# Patient Record
Sex: FEMALE | Race: WHITE | HISPANIC OR LATINO | Employment: OTHER | URBAN - METROPOLITAN AREA
[De-identification: names, ages, dates, MRNs, and addresses within clinical notes are randomized per-mention and may not be internally consistent; named-entity substitution may affect disease eponyms.]

---

## 2017-07-26 ENCOUNTER — TELEPHONE (OUTPATIENT)
Dept: ELECTROPHYSIOLOGY | Facility: CLINIC | Age: 82
End: 2017-07-26

## 2017-07-26 NOTE — TELEPHONE ENCOUNTER
Left message notifying pt that Dr. Shaw was booking out of clinic on 8/11/17 and that her appointment will be cancelled.  Instructed pt to call the office to reschedule appointment at her convenience.

## 2017-08-11 ENCOUNTER — CLINICAL SUPPORT (OUTPATIENT)
Dept: ELECTROPHYSIOLOGY | Facility: CLINIC | Age: 82
End: 2017-08-11
Payer: MEDICARE

## 2017-08-11 DIAGNOSIS — Z95.0 CARDIAC PACEMAKER IN SITU: Primary | ICD-10-CM

## 2017-08-11 DIAGNOSIS — Z95.0 CARDIAC PACEMAKER IN SITU: ICD-10-CM

## 2017-08-11 PROCEDURE — 93280 PM DEVICE PROGR EVAL DUAL: CPT | Mod: PBBFAC | Performed by: INTERNAL MEDICINE

## 2017-08-14 ENCOUNTER — OFFICE VISIT (OUTPATIENT)
Dept: INTERNAL MEDICINE | Facility: CLINIC | Age: 82
End: 2017-08-14
Payer: MEDICARE

## 2017-08-14 ENCOUNTER — LAB VISIT (OUTPATIENT)
Dept: LAB | Facility: HOSPITAL | Age: 82
End: 2017-08-14
Attending: INTERNAL MEDICINE
Payer: MEDICARE

## 2017-08-14 VITALS
SYSTOLIC BLOOD PRESSURE: 131 MMHG | HEIGHT: 61 IN | BODY MASS INDEX: 32.38 KG/M2 | HEART RATE: 66 BPM | DIASTOLIC BLOOD PRESSURE: 60 MMHG | WEIGHT: 171.5 LBS

## 2017-08-14 DIAGNOSIS — R60.0 BILATERAL LOWER EXTREMITY EDEMA: ICD-10-CM

## 2017-08-14 DIAGNOSIS — Z79.891 CHRONIC PRESCRIPTION OPIATE USE: ICD-10-CM

## 2017-08-14 DIAGNOSIS — E79.0 HYPERURICEMIA: ICD-10-CM

## 2017-08-14 DIAGNOSIS — I10 ESSENTIAL HYPERTENSION: ICD-10-CM

## 2017-08-14 DIAGNOSIS — R20.2 PARESTHESIA OF SKIN: ICD-10-CM

## 2017-08-14 DIAGNOSIS — R32 URINARY INCONTINENCE, UNSPECIFIED TYPE: ICD-10-CM

## 2017-08-14 DIAGNOSIS — M25.50 ARTHRALGIA, UNSPECIFIED JOINT: ICD-10-CM

## 2017-08-14 DIAGNOSIS — M75.52 BURSITIS OF BOTH SHOULDERS: Primary | ICD-10-CM

## 2017-08-14 DIAGNOSIS — M75.51 BURSITIS OF BOTH SHOULDERS: Primary | ICD-10-CM

## 2017-08-14 DIAGNOSIS — E78.5 HYPERLIPIDEMIA, UNSPECIFIED HYPERLIPIDEMIA TYPE: ICD-10-CM

## 2017-08-14 DIAGNOSIS — G89.29 OTHER CHRONIC PAIN: ICD-10-CM

## 2017-08-14 LAB
ALBUMIN SERPL BCP-MCNC: 3.9 G/DL
ALP SERPL-CCNC: 95 U/L
ALT SERPL W/O P-5'-P-CCNC: 12 U/L
ANION GAP SERPL CALC-SCNC: 9 MMOL/L
AST SERPL-CCNC: 15 U/L
BILIRUB SERPL-MCNC: 0.5 MG/DL
BNP SERPL-MCNC: 69 PG/ML
BUN SERPL-MCNC: 24 MG/DL
CALCIUM SERPL-MCNC: 9.3 MG/DL
CHLORIDE SERPL-SCNC: 103 MMOL/L
CO2 SERPL-SCNC: 27 MMOL/L
CREAT SERPL-MCNC: 0.9 MG/DL
ERYTHROCYTE [DISTWIDTH] IN BLOOD BY AUTOMATED COUNT: 14.4 %
EST. GFR  (AFRICAN AMERICAN): >60 ML/MIN/1.73 M^2
EST. GFR  (NON AFRICAN AMERICAN): 57.7 ML/MIN/1.73 M^2
GLUCOSE SERPL-MCNC: 99 MG/DL
HCT VFR BLD AUTO: 38.1 %
HGB BLD-MCNC: 12.7 G/DL
MCH RBC QN AUTO: 30.2 PG
MCHC RBC AUTO-ENTMCNC: 33.3 G/DL
MCV RBC AUTO: 91 FL
PLATELET # BLD AUTO: 178 K/UL
PMV BLD AUTO: 11.5 FL
POTASSIUM SERPL-SCNC: 5.1 MMOL/L
PROT SERPL-MCNC: 7.2 G/DL
RBC # BLD AUTO: 4.2 M/UL
RHEUMATOID FACT SERPL-ACNC: <10 IU/ML
SODIUM SERPL-SCNC: 139 MMOL/L
TSH SERPL DL<=0.005 MIU/L-ACNC: 2.57 UIU/ML
WBC # BLD AUTO: 7.39 K/UL

## 2017-08-14 PROCEDURE — 36415 COLL VENOUS BLD VENIPUNCTURE: CPT

## 2017-08-14 PROCEDURE — 84550 ASSAY OF BLOOD/URIC ACID: CPT

## 2017-08-14 PROCEDURE — 84443 ASSAY THYROID STIM HORMONE: CPT

## 2017-08-14 PROCEDURE — 86038 ANTINUCLEAR ANTIBODIES: CPT

## 2017-08-14 PROCEDURE — 80053 COMPREHEN METABOLIC PANEL: CPT

## 2017-08-14 PROCEDURE — 99214 OFFICE O/P EST MOD 30 MIN: CPT | Mod: PBBFAC | Performed by: INTERNAL MEDICINE

## 2017-08-14 PROCEDURE — 99999 PR PBB SHADOW E&M-EST. PATIENT-LVL IV: CPT | Mod: PBBFAC,,, | Performed by: INTERNAL MEDICINE

## 2017-08-14 PROCEDURE — 86431 RHEUMATOID FACTOR QUANT: CPT

## 2017-08-14 PROCEDURE — 85027 COMPLETE CBC AUTOMATED: CPT

## 2017-08-14 PROCEDURE — 99204 OFFICE O/P NEW MOD 45 MIN: CPT | Mod: S$PBB,,, | Performed by: INTERNAL MEDICINE

## 2017-08-14 PROCEDURE — 83880 ASSAY OF NATRIURETIC PEPTIDE: CPT

## 2017-08-14 PROCEDURE — 80061 LIPID PANEL: CPT

## 2017-08-14 PROCEDURE — 82607 VITAMIN B-12: CPT

## 2017-08-14 RX ORDER — FELODIPINE 5 MG/1
5 TABLET, EXTENDED RELEASE ORAL DAILY
COMMUNITY
End: 2019-03-11 | Stop reason: SDUPTHER

## 2017-08-14 RX ORDER — DULOXETIN HYDROCHLORIDE 60 MG/1
60 CAPSULE, DELAYED RELEASE ORAL DAILY
COMMUNITY
End: 2019-03-11 | Stop reason: SDUPTHER

## 2017-08-14 RX ORDER — DICLOFENAC SODIUM 10 MG/G
2 GEL TOPICAL DAILY
COMMUNITY
End: 2019-03-19 | Stop reason: SDUPTHER

## 2017-08-14 RX ORDER — DEXTROMETHORPHAN HYDROBROMIDE, GUAIFENESIN 5; 100 MG/5ML; MG/5ML
650 LIQUID ORAL EVERY 8 HOURS
COMMUNITY

## 2017-08-14 RX ORDER — PREGABALIN 75 MG/1
75 CAPSULE ORAL 2 TIMES DAILY
COMMUNITY
End: 2019-03-11 | Stop reason: SDUPTHER

## 2017-08-14 RX ORDER — MECLIZINE HYDROCHLORIDE CHEWABLE TABLETS 25 MG/1
32 TABLET, CHEWABLE ORAL
COMMUNITY

## 2017-08-14 RX ORDER — ROSUVASTATIN CALCIUM 20 MG/1
20 TABLET, COATED ORAL DAILY
COMMUNITY

## 2017-08-14 RX ORDER — OXYCODONE AND ACETAMINOPHEN 7.5; 325 MG/1; MG/1
1 TABLET ORAL EVERY 4 HOURS PRN
COMMUNITY
End: 2017-08-14 | Stop reason: SDUPTHER

## 2017-08-14 RX ORDER — OXYCODONE AND ACETAMINOPHEN 7.5; 325 MG/1; MG/1
TABLET ORAL
Qty: 60 TABLET | Refills: 0 | Status: SHIPPED | OUTPATIENT
Start: 2017-08-14

## 2017-08-14 RX ORDER — BISACODYL 5 MG
5 TABLET, DELAYED RELEASE (ENTERIC COATED) ORAL DAILY PRN
COMMUNITY

## 2017-08-14 RX ORDER — CARVEDILOL 6.25 MG/1
6.25 TABLET ORAL 2 TIMES DAILY WITH MEALS
COMMUNITY
End: 2019-03-11 | Stop reason: SDUPTHER

## 2017-08-14 NOTE — PROGRESS NOTES
Subjective:       Patient ID: Dalia Steele is a 87 y.o. female.    Chief Complaint: Establish Care    HPI    Patient is accompanied by son, Norbert.    First visit with me, upcoming appointment with Cardiology EP.     Numbness L hand. Going on a few weeks, most notable in mornings.    history of gout L hand.    bilateral shoulder pain, worse with movement.     Ileus with IM and oral steroids in past.    Reviewed PMH, PSH, SH, FH, allergies, and medications.     Review of Systems   All other systems reviewed and are negative.      Objective:      Physical Exam   Constitutional: No distress.    woman whose Body mass index is 32.41 kg/m². Ambulates with walker. Exam conducted from chair.   HENT:   Head: Atraumatic.   Right Ear: Tympanic membrane normal.   Left Ear: Tympanic membrane normal.   Mouth/Throat: Oropharynx is clear and moist. No oropharyngeal exudate.   Eyes: Pupils are equal, round, and reactive to light. Right eye exhibits no discharge. Left eye exhibits no discharge.   Neck: Normal range of motion. No thyromegaly present.   Cardiovascular: Normal rate and regular rhythm.    Murmur heard.   Systolic (KATYA RUSB) murmur is present with a grade of 3/6   Pulmonary/Chest: Effort normal and breath sounds normal. No stridor. She has no wheezes. She has no rales.   Abdominal: Soft. She exhibits no distension. There is no tenderness. There is no guarding.   Musculoskeletal: She exhibits edema (soft pitting edema in b/L LE).   Lymphadenopathy:     She has no cervical adenopathy.   Neurological: She is alert. Gait (ambulates with walker with limp) abnormal.    intact bilaterally    Skin: Skin is warm and dry. No rash noted.   Psychiatric: She has a normal mood and affect. Her behavior is normal.   Nursing note and vitals reviewed.      Vitals:    08/14/17 1144   BP: 131/60   BP Location: Left arm   Patient Position: Sitting   BP Method: Large (Manual)   Pulse: 66   Weight: 77.8 kg (171 lb 8.3 oz)   Height:  "5' 1" (1.549 m)     Body mass index is 32.41 kg/m².    Assessment:       1. Bursitis of both shoulders    2. Other chronic pain    3. Hyperlipidemia, unspecified hyperlipidemia type    4. Essential hypertension    5. Arthralgia, unspecified joint    6. Hyperuricemia    7. Paresthesia of skin    8. Bilateral lower extremity edema    9. Urinary incontinence, unspecified type        Plan:   Dalia was seen today for establish care.    Diagnoses and all orders for this visit:    Bursitis of both shoulders:  Likely related to regular use of walker. Interested in eval with Sports Med Dr Garibay, referral placed.  -     Cancel: Ambulatory referral to Orthopedics  -     Ambulatory consult to Sports Medicine    Other chronic pain:  Longstanding, likely related to spondylosis, symptoms are well controlled and stable on 1/2 tab Percocet BID, no adverse side effects. Continue to prescribe via PCP.  -     oxycodone-acetaminophen (PERCOCET) 7.5-325 mg per tablet; Take 1/2 tablet two times a day    Hyperlipidemia, unspecified hyperlipidemia type:  Pt on Crestor, will continue and recheck levels.  -     Lipid panel; Future    Essential hypertension:  Prior diagnosis, well controlled on current management. No changes at this time, will continue to monitor.   -     CBC Without Differential; Future  -     Comprehensive metabolic panel; Future  -     TSH; Future    Arthralgia, unspecified joint:  Joint pain in hand may be due to autoimmune disease, check on labs. If pain persists refer to Orthopedics hand.  -     WILL; Future  -     Rheumatoid factor; Future    Hyperuricemia:  Reports history of gout in left hand, will check uric acid, if gout flares are sparse will use PRN treatment.  -     Uric acid; Future    Paresthesia of skin:  rule out B12 deficiency.  -     Vitamin B12; Future    Bilateral lower extremity edema:  Likely chronic venous insufficiency, given heart history check BNP.  -     Brain natriuretic peptide; Future    Urinary " incontinence, unspecified type:  Longstanding problem. symptoms are stable. If worsens or new symptoms develop refer to Urogyn.    Return in about 6 months (around 2/14/2018).  Josh Cho MD  Internal Medicine    Portions of this note were completed using Dragon medical dictation software. Please excuse typographical or syntax errors.

## 2017-08-15 LAB
ANA SER QL IF: NORMAL
CHOLEST/HDLC SERPL: 2.5 {RATIO}
HDL/CHOLESTEROL RATIO: 39.4 %
HDLC SERPL-MCNC: 170 MG/DL
HDLC SERPL-MCNC: 67 MG/DL
LDLC SERPL CALC-MCNC: 76.6 MG/DL
NONHDLC SERPL-MCNC: 103 MG/DL
TRIGL SERPL-MCNC: 132 MG/DL
URATE SERPL-MCNC: 5.6 MG/DL
VIT B12 SERPL-MCNC: 314 PG/ML

## 2017-08-21 ENCOUNTER — TELEPHONE (OUTPATIENT)
Dept: ELECTROPHYSIOLOGY | Facility: CLINIC | Age: 82
End: 2017-08-21

## 2017-08-21 NOTE — TELEPHONE ENCOUNTER
Spoke with pt's son, Norbert, and inquired about more medical records for pt. Records from [place] were requested, but only 6 pages were sent. He said that he had to speak with his sister since the pt sees MDs in [place], [place], and [place]. Pt said he probably won't have the info until the pt comes in to see Dr. Shaw. I told Norbert that I would call him later on today and tomorrow to see if he has that info just in case.

## 2017-08-23 ENCOUNTER — OFFICE VISIT (OUTPATIENT)
Dept: ELECTROPHYSIOLOGY | Facility: CLINIC | Age: 82
End: 2017-08-23
Payer: MEDICARE

## 2017-08-23 VITALS
DIASTOLIC BLOOD PRESSURE: 74 MMHG | BODY MASS INDEX: 31.92 KG/M2 | HEART RATE: 109 BPM | WEIGHT: 169.06 LBS | HEIGHT: 61 IN | SYSTOLIC BLOOD PRESSURE: 126 MMHG

## 2017-08-23 DIAGNOSIS — I10 ESSENTIAL HYPERTENSION: ICD-10-CM

## 2017-08-23 DIAGNOSIS — Z95.0 PACEMAKER: ICD-10-CM

## 2017-08-23 DIAGNOSIS — R00.1 BRADYCARDIA: Primary | ICD-10-CM

## 2017-08-23 DIAGNOSIS — R00.1 BRADYCARDIA: ICD-10-CM

## 2017-08-23 PROCEDURE — 99204 OFFICE O/P NEW MOD 45 MIN: CPT | Mod: S$PBB,,, | Performed by: INTERNAL MEDICINE

## 2017-08-23 PROCEDURE — 93010 ELECTROCARDIOGRAM REPORT: CPT | Mod: ,,, | Performed by: INTERNAL MEDICINE

## 2017-08-23 PROCEDURE — 1126F AMNT PAIN NOTED NONE PRSNT: CPT | Mod: ,,, | Performed by: INTERNAL MEDICINE

## 2017-08-23 PROCEDURE — 93005 ELECTROCARDIOGRAM TRACING: CPT | Mod: PBBFAC | Performed by: INTERNAL MEDICINE

## 2017-08-23 PROCEDURE — 99213 OFFICE O/P EST LOW 20 MIN: CPT | Mod: PBBFAC | Performed by: INTERNAL MEDICINE

## 2017-08-23 PROCEDURE — 1159F MED LIST DOCD IN RCRD: CPT | Mod: ,,, | Performed by: INTERNAL MEDICINE

## 2017-08-23 PROCEDURE — 99999 PR PBB SHADOW E&M-EST. PATIENT-LVL III: CPT | Mod: PBBFAC,,, | Performed by: INTERNAL MEDICINE

## 2017-08-23 NOTE — PROGRESS NOTES
Subjective:    Patient ID:  Dalia Steele is a 87 y.o. female who presents for evaluation of Pacemaker Check      HPI 88 yo female with Htn, bradycardia, pacemaker, post polio syndrome.  She normally lives in [place] and [place].  Son is a friend of Fernando Freitas.  Her main cities of living going forward are [place] and [facility].  Dual chamber PPM implanted 2/28/97.  Unclear initial indication, suspect sinus node dysfunction.  Had noted fatigue, and this improved.  Generator change in 2003 and 1/14/13.  Device interrogation reveals stable function of leads, RA pacing 42%, RV pacing 1%, no significant arrhythmias.  Doing well overall.  Denies dyspnea, syncope, palpitations.  Has vertigo.    Review of Systems   Constitution: Negative. Negative for weakness and malaise/fatigue.   Cardiovascular: Negative for chest pain, dyspnea on exertion, irregular heartbeat, leg swelling, near-syncope, orthopnea, palpitations, paroxysmal nocturnal dyspnea and syncope.   Respiratory: Negative for cough and shortness of breath.    Neurological: Negative for dizziness and light-headedness.   All other systems reviewed and are negative.       Objective:    Physical Exam   Constitutional: She is oriented to person, place, and time. She appears well-developed and well-nourished.   Eyes: Conjunctivae are normal. No scleral icterus.   Neck: No JVD present. No tracheal deviation present.   Cardiovascular: Normal rate and regular rhythm.  PMI is not displaced.    Pulmonary/Chest: Effort normal and breath sounds normal. No respiratory distress.   Abdominal: Soft. There is no hepatosplenomegaly. There is no tenderness.   Musculoskeletal: She exhibits no edema or tenderness.   Neurological: She is alert and oriented to person, place, and time.   Skin: Skin is warm and dry. No rash noted.   Psychiatric: She has a normal mood and affect. Her behavior is normal.         Assessment:       1. Bradycardia    2. Pacemaker    3. Essential  hypertension         Plan:             Doing well overall.  Here to establish care.  She would like to have her primary cardiology care here.  We will enroll her in our device clinic.  Echo with color flow.  F/u in 6 months.

## 2017-08-25 ENCOUNTER — HOSPITAL ENCOUNTER (OUTPATIENT)
Dept: RADIOLOGY | Facility: HOSPITAL | Age: 82
Discharge: HOME | End: 2017-08-25
Attending: PHYSICIAN ASSISTANT
Payer: MEDICARE

## 2017-08-25 ENCOUNTER — OFFICE VISIT (OUTPATIENT)
Dept: ORTHOPEDICS | Facility: CLINIC | Age: 82
End: 2017-08-25
Payer: MEDICARE

## 2017-08-25 VITALS — WEIGHT: 169.88 LBS | BODY MASS INDEX: 32.07 KG/M2 | HEIGHT: 61 IN

## 2017-08-25 DIAGNOSIS — M25.511 BILATERAL SHOULDER PAIN, UNSPECIFIED CHRONICITY: ICD-10-CM

## 2017-08-25 DIAGNOSIS — M19.012 PRIMARY OSTEOARTHRITIS OF BOTH SHOULDERS: Primary | ICD-10-CM

## 2017-08-25 DIAGNOSIS — M19.011 PRIMARY OSTEOARTHRITIS OF BOTH SHOULDERS: Primary | ICD-10-CM

## 2017-08-25 DIAGNOSIS — M75.31 CALCIFIC TENDINITIS OF BOTH SHOULDERS: ICD-10-CM

## 2017-08-25 DIAGNOSIS — M75.32 CALCIFIC TENDINITIS OF BOTH SHOULDERS: ICD-10-CM

## 2017-08-25 DIAGNOSIS — M25.512 BILATERAL SHOULDER PAIN, UNSPECIFIED CHRONICITY: ICD-10-CM

## 2017-08-25 PROCEDURE — 73030 X-RAY EXAM OF SHOULDER: CPT | Mod: TC,50

## 2017-08-25 PROCEDURE — 1159F MED LIST DOCD IN RCRD: CPT | Mod: ,,, | Performed by: PHYSICIAN ASSISTANT

## 2017-08-25 PROCEDURE — 99203 OFFICE O/P NEW LOW 30 MIN: CPT | Mod: S$PBB,,, | Performed by: PHYSICIAN ASSISTANT

## 2017-08-25 PROCEDURE — 73030 X-RAY EXAM OF SHOULDER: CPT | Mod: 26,50,, | Performed by: RADIOLOGY

## 2017-08-25 PROCEDURE — 1125F AMNT PAIN NOTED PAIN PRSNT: CPT | Mod: ,,, | Performed by: PHYSICIAN ASSISTANT

## 2017-08-25 PROCEDURE — 99999 PR PBB SHADOW E&M-EST. PATIENT-LVL III: CPT | Mod: PBBFAC,,, | Performed by: PHYSICIAN ASSISTANT

## 2017-08-25 NOTE — LETTER
August 25, 2017      Josh Cho MD  [address]  [facility] [address]           [facility] Orthopedics  [address]  [facility] [address]  Phone: [phone]          Patient: Dalia Steele   MR Number: [id number]   YOB: 1930   Date of Visit: 8/25/2017       Dear Dr. Josh Cho:    Thank you for referring Dalia Steele to me for evaluation. Attached you will find relevant portions of my assessment and plan of care.    If you have questions, please do not hesitate to call me. I look forward to following Dalia Steele along with you.    Sincerely,    Nighat Winters PA-C    Enclosure  CC:  No Recipients    If you would like to receive this communication electronically, please contact [email or web address] or [phone] to request more information on [employer] Link access.    For providers and/or their staff who would like to refer a patient to [facility], please contact us through our one-stop-shop provider referral line, [facility], at [phone].    If you feel you have received this communication in error or would no longer like to receive these types of communications, please e-mail [email or web address]

## 2017-08-25 NOTE — PROGRESS NOTES
"Subjective:      Patient ID: Dalia Steele is a 87 y.o. female.    Chief Complaint: No chief complaint on file.    HPI  87 year old female presents with chief complaint of bilateral shoulder pain R>L x "years." She is RHD and denies trauma. Pain is worse at night when laying on it. Voltaren gel, percocet, and tylenol provide mild relief. No PT has been done. She says she received bilateral shoulder injections in 2015 that gave good relief. She had GI adverse reaction to steroids in the past and ended up in the hospital. She has a pacemaker.   Review of Systems   Constitution: Negative for chills, fever and night sweats.   Cardiovascular: Negative for chest pain.   Respiratory: Negative for cough and shortness of breath.    Hematologic/Lymphatic: Does not bruise/bleed easily.   Skin: Negative for color change.   Gastrointestinal: Negative for heartburn.   Genitourinary: Negative for dysuria.   Neurological: Negative for numbness and paresthesias.   Psychiatric/Behavioral: Negative for altered mental status.   Allergic/Immunologic: Negative for persistent infections.         Objective:            General    Vitals reviewed.  Constitutional: She is oriented to person, place, and time. She appears well-developed and well-nourished.   Cardiovascular: Normal rate.    Neurological: She is alert and oriented to person, place, and time.         Right Shoulder Exam     Range of Motion   Active Abduction: 110   Forward Flexion: 120   External Rotation 0 degrees: normal   Internal Rotation 0 degrees: normal     Tests & Signs   Hawkin's test: positive  Impingement: positive    Other   Sensation: normal    Comments:  ROM and strength is symmetrical on both sides. Positive empty can test.    Left Shoulder Exam     Range of Motion   Active Abduction: 110   Forward Flexion: 120   External Rotation 0 degrees: normal   Internal Rotation 0 degrees: normal     Tests & Signs   Hawkin's test: positive  Impingement: positive    Other "   Sensation: normal     Comments:  ROM and strength is symmetrical on both sides. Positive empty can test.       Vascular Exam     Right Pulses      Radial:                    2+      Left Pulses      Radial:                    2+            X-ray: ordered and reviewed by myself. Right: No fracture or dislocation.  Degenerative changes are noted at the glenohumeral and acromioclavicular joints.  There are several periarticular mineralized foci suggesting calcification the nidus.  Left: No fracture or dislocation.  Degenerative changes are noted at the glenohumeral and acromioclavicular joints.  Mineralization at the rotator cuff footprint suggesting calcific tendinitis.  Mineralized body seen beneath the coracoid process, likely within the subscapular recess.      Assessment:       Encounter Diagnoses   Name Primary?    Primary osteoarthritis of both shoulders Yes    Calcific tendinitis of both shoulders           Plan:       Discussed treatment options with patient. She is not interested in surgery. She cannot have cortisone injection due to adverse reaction in the past. Offered to order PT for her to try and she wants to think about it. Continue tylenol and voltaren gel as needed. RTC prn.

## 2017-08-28 ENCOUNTER — HOSPITAL ENCOUNTER (OUTPATIENT)
Dept: CARDIOLOGY | Facility: CLINIC | Age: 82
Discharge: HOME | End: 2017-08-28
Payer: MEDICARE

## 2017-08-28 DIAGNOSIS — R00.1 BRADYCARDIA: ICD-10-CM

## 2017-08-28 LAB
AORTIC VALVE REGURGITATION: ABNORMAL
AORTIC VALVE STENOSIS: ABNORMAL
ESTIMATED PA SYSTOLIC PRESSURE: 27.21
GLOBAL PERICARDIAL EFFUSION: ABNORMAL
RETIRED EF AND QEF - SEE NOTES: 60 (ref 55–65)
TRICUSPID VALVE REGURGITATION: ABNORMAL

## 2017-08-28 PROCEDURE — 93306 TTE W/DOPPLER COMPLETE: CPT | Mod: PBBFAC | Performed by: INTERNAL MEDICINE

## 2017-08-29 ENCOUNTER — TELEPHONE (OUTPATIENT)
Dept: ELECTROPHYSIOLOGY | Facility: CLINIC | Age: 82
End: 2017-08-29

## 2017-08-29 NOTE — TELEPHONE ENCOUNTER
----- Message from Timothy Shaw MD sent at 8/28/2017  5:29 PM CDT -----  Echo reveals normal structure and function please relay to patient

## 2017-08-29 NOTE — TELEPHONE ENCOUNTER
Spoke with patient's son and advised that echo showed normal structure and function (as per Dr Shaw). He verbalizes understanding.

## 2017-10-12 PROBLEM — E78.5 HYPERLIPIDEMIA: Status: ACTIVE | Noted: 2017-10-12

## 2017-10-12 PROBLEM — R32 URINARY INCONTINENCE: Status: ACTIVE | Noted: 2017-10-12

## 2017-10-12 PROBLEM — Z79.891 CHRONIC PRESCRIPTION OPIATE USE: Status: ACTIVE | Noted: 2017-10-12

## 2017-10-31 ENCOUNTER — TELEPHONE (OUTPATIENT)
Dept: INTERNAL MEDICINE | Facility: CLINIC | Age: 82
End: 2017-10-31

## 2017-10-31 NOTE — TELEPHONE ENCOUNTER
Pt. apparently is now agreeing along with her son to have pt receive P/T. Gait training and leg strengthing exercises. Pt. Lives in the [facility] and goes to an adult day care center several days a week. Orders can be faxed to Fernando. His phone is a fax if he does not answer the phone.

## 2017-10-31 NOTE — TELEPHONE ENCOUNTER
----- Message from Maribel Crawford sent at 10/31/2017  2:19 PM CDT -----  Contact: [facility] at [phone]  Fernando requesting physical therapy orders for pt.

## 2017-11-01 NOTE — TELEPHONE ENCOUNTER
----- Message from Antoine Drake sent at 11/1/2017  8:55 AM CDT -----  Contact: Fernando/ Ana sof[facility]/ [facility]/ [phone] ph/fax  The physical therapist is calling to check the status of the request made on yesterday to have PT orders faxed over to have the pt evaluated and treated.  Fernando would like to speak with someone in the office today, if possible.  Please call and advise.    Thank you

## 2017-11-01 NOTE — TELEPHONE ENCOUNTER
Raymundo Moore MD   to Nighat Winters PA-C  Me           8:25 AM   Nighat - Dr Cho is out of the clinic currently; I am covering. On chart review, it looks like you recommended PT for this patient -- I'm not clear whether she needs home health PT or outpatient PT. It sounds like she's willing to try this now -- can you check in and put in the correct order?   Thanks!     Raymundo Moore MD

## 2017-11-01 NOTE — TELEPHONE ENCOUNTER
This was discussed at her visit with Orthopedics - will forward note to that provider so that PT can be ordered appropriately.

## 2017-11-06 ENCOUNTER — TELEPHONE (OUTPATIENT)
Dept: INTERNAL MEDICINE | Facility: CLINIC | Age: 82
End: 2017-11-06

## 2017-11-06 NOTE — TELEPHONE ENCOUNTER
----- Message from Deanne Caputo sent at 11/6/2017  8:49 AM CST -----  Contact: Fernando/ [facility]/ [phone]   Fernando is calling to receive a order for Physical Therapy  For the pt. The fax number [phone]. Please call and advise     Thank you

## 2017-11-10 ENCOUNTER — TELEPHONE (OUTPATIENT)
Dept: INTERNAL MEDICINE | Facility: CLINIC | Age: 82
End: 2017-11-10

## 2017-11-10 NOTE — TELEPHONE ENCOUNTER
Pt's son is calling wanting to know the status of the OT orders.   Please see previous mssgs.    Thank you

## 2017-11-10 NOTE — TELEPHONE ENCOUNTER
----- Message from Krystal James sent at 11/10/2017 11:11 AM CST -----  Contact: norbert [phone]    Patient's son Norbert to check status of Physical Therapy  For the pt. The fax number [phone]. Please call and advise      Thank you

## 2017-11-15 ENCOUNTER — TELEPHONE (OUTPATIENT)
Dept: INTERNAL MEDICINE | Facility: CLINIC | Age: 82
End: 2017-11-15

## 2017-11-15 DIAGNOSIS — R53.81 DEBILITATED: Primary | ICD-10-CM

## 2017-11-15 NOTE — TELEPHONE ENCOUNTER
----- Message from Jasmin Segura sent at 11/14/2017  2:05 PM CST -----  Contact: Pt Son Norbert [phone]  Pt son would like a call back from the nurse regarding his mom getting Physical Therapy. He would like orders put in. Please advise

## 2017-11-15 NOTE — TELEPHONE ENCOUNTER
Looks like orders were not put in while you were out.   pt's son is calling and would like orderin in for his mom to have PT done at [facility]

## 2019-01-21 ENCOUNTER — PATIENT OUTREACH (OUTPATIENT)
Dept: ADMINISTRATIVE | Facility: HOSPITAL | Age: 84
End: 2019-01-21

## 2019-01-21 DIAGNOSIS — E78.5 HYPERLIPIDEMIA, UNSPECIFIED HYPERLIPIDEMIA TYPE: ICD-10-CM

## 2019-01-21 DIAGNOSIS — I10 ESSENTIAL HYPERTENSION: Primary | ICD-10-CM

## 2019-01-21 NOTE — PROGRESS NOTES
Pre-visit audit complete, pt due for fasting labs.  Attempted to contact pt to schedule lab visit, voice message left requesting a return call.

## 2019-01-30 ENCOUNTER — LAB VISIT (OUTPATIENT)
Dept: LAB | Facility: HOSPITAL | Age: 84
End: 2019-01-30
Attending: INTERNAL MEDICINE
Payer: MEDICARE

## 2019-01-30 DIAGNOSIS — I10 ESSENTIAL HYPERTENSION: ICD-10-CM

## 2019-01-30 DIAGNOSIS — E78.5 HYPERLIPIDEMIA, UNSPECIFIED HYPERLIPIDEMIA TYPE: ICD-10-CM

## 2019-01-30 LAB
ALBUMIN SERPL BCP-MCNC: 3.8 G/DL
ALP SERPL-CCNC: 83 U/L
ALT SERPL W/O P-5'-P-CCNC: 23 U/L
ANION GAP SERPL CALC-SCNC: 7 MMOL/L
AST SERPL-CCNC: 17 U/L
BILIRUB SERPL-MCNC: 0.5 MG/DL
BUN SERPL-MCNC: 10 MG/DL
CALCIUM SERPL-MCNC: 9.7 MG/DL
CHLORIDE SERPL-SCNC: 102 MMOL/L
CHOLEST SERPL-MCNC: 237 MG/DL
CHOLEST/HDLC SERPL: 3.4 {RATIO}
CO2 SERPL-SCNC: 32 MMOL/L
CREAT SERPL-MCNC: 0.7 MG/DL
EST. GFR  (AFRICAN AMERICAN): >60 ML/MIN/1.73 M^2
EST. GFR  (NON AFRICAN AMERICAN): >60 ML/MIN/1.73 M^2
GLUCOSE SERPL-MCNC: 100 MG/DL
HDLC SERPL-MCNC: 70 MG/DL
HDLC SERPL: 29.5 %
LDLC SERPL CALC-MCNC: 131.6 MG/DL
NONHDLC SERPL-MCNC: 167 MG/DL
POTASSIUM SERPL-SCNC: 4 MMOL/L
PROT SERPL-MCNC: 7.4 G/DL
SODIUM SERPL-SCNC: 141 MMOL/L
TRIGL SERPL-MCNC: 177 MG/DL

## 2019-01-30 PROCEDURE — 80061 LIPID PANEL: CPT

## 2019-01-30 PROCEDURE — 36415 COLL VENOUS BLD VENIPUNCTURE: CPT

## 2019-01-30 PROCEDURE — 80053 COMPREHEN METABOLIC PANEL: CPT

## 2019-01-31 ENCOUNTER — OFFICE VISIT (OUTPATIENT)
Dept: INTERNAL MEDICINE | Facility: CLINIC | Age: 84
End: 2019-01-31
Payer: MEDICARE

## 2019-01-31 VITALS
HEIGHT: 61 IN | HEART RATE: 63 BPM | DIASTOLIC BLOOD PRESSURE: 68 MMHG | WEIGHT: 165 LBS | SYSTOLIC BLOOD PRESSURE: 112 MMHG | BODY MASS INDEX: 31.15 KG/M2

## 2019-01-31 DIAGNOSIS — I10 ESSENTIAL HYPERTENSION: ICD-10-CM

## 2019-01-31 DIAGNOSIS — D17.22 LIPOMA OF LEFT UPPER EXTREMITY: ICD-10-CM

## 2019-01-31 DIAGNOSIS — Z00.00 ANNUAL PHYSICAL EXAM: Primary | ICD-10-CM

## 2019-01-31 DIAGNOSIS — I35.0 MODERATE AORTIC VALVE STENOSIS: ICD-10-CM

## 2019-01-31 DIAGNOSIS — E78.5 HYPERLIPIDEMIA, UNSPECIFIED HYPERLIPIDEMIA TYPE: ICD-10-CM

## 2019-01-31 PROCEDURE — 99999 PR PBB SHADOW E&M-EST. PATIENT-LVL III: ICD-10-PCS | Mod: PBBFAC,,, | Performed by: INTERNAL MEDICINE

## 2019-01-31 PROCEDURE — 99397 PR PREVENTIVE VISIT,EST,65 & OVER: ICD-10-PCS | Mod: S$PBB,,, | Performed by: INTERNAL MEDICINE

## 2019-01-31 PROCEDURE — 99397 PER PM REEVAL EST PAT 65+ YR: CPT | Mod: S$PBB,,, | Performed by: INTERNAL MEDICINE

## 2019-01-31 PROCEDURE — 99213 OFFICE O/P EST LOW 20 MIN: CPT | Mod: PBBFAC | Performed by: INTERNAL MEDICINE

## 2019-01-31 PROCEDURE — 99999 PR PBB SHADOW E&M-EST. PATIENT-LVL III: CPT | Mod: PBBFAC,,, | Performed by: INTERNAL MEDICINE

## 2019-01-31 NOTE — PROGRESS NOTES
Subjective:       Patient ID: Dalia Steele is a 89 y.o. female.    Chief Complaint: Annual Exam    HPI    Patient is accompanied by her son, Norbert.    Last visit with me October 2017.  Seeing since then by Cardiology and Orthopedics, but has not had any clinic visits for 2018.    Patient is doing well.  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  Denies any dyspnea on exertion or chest pain. Denies any lightheadedness.    Development of new lipoma in the left upper extremity near the wrist.  She has prior diagnosis of lipoma in the left lower extremity.    Reviewed PMH, PSH, SH, FH, allergies, and medications.     Review of Systems   All other systems reviewed and are negative.      Objective:      Physical Exam   Constitutional: She is oriented to person, place, and time. No distress.   Sitting in rolling walker, exam conducted from here. Ambulates with rolling walker.   HENT:   Head: Atraumatic.   Mouth/Throat: Oropharynx is clear and moist. No oropharyngeal exudate.   Eyes: Pupils are equal, round, and reactive to light. Right eye exhibits no discharge. Left eye exhibits no discharge.       Neck: Normal range of motion. No thyromegaly present.   Cardiovascular: Normal rate and regular rhythm.   Murmur heard.   Systolic (LUSB crescendo, holosystolic murmur 3/6 at apex) murmur is present with a grade of 3/6.  Pulmonary/Chest: Effort normal and breath sounds normal. No stridor. She has no wheezes. She has no rales.   Abdominal: Soft. There is no tenderness. There is no guarding.   Musculoskeletal: She exhibits no edema or tenderness.   LLE is shorter than RLE secondary to old polio and surgery.   Lymphadenopathy:     She has no cervical adenopathy.   Neurological: She is alert and oriented to person, place, and time.   Skin: Skin is warm and dry. No rash noted.   Lipoma of dorsum of left wrist.  Lipoma in left lower extremity distal to the knee.   Psychiatric: She has a normal  "mood and affect. Her behavior is normal.   Nursing note and vitals reviewed.      Vitals:    01/31/19 1408   BP: 112/68   BP Location: Left arm   Patient Position: Sitting   BP Method: Large (Manual)   Pulse: 63   Weight: 74.8 kg (165 lb)   Height: 5' 1" (1.549 m)     Body mass index is 31.18 kg/m².    RESULTS: Reviewed labs from last 12 months    Assessment:       1. Annual physical exam    2. Lipoma of left upper extremity    3. Hyperlipidemia, unspecified hyperlipidemia type    4. Essential hypertension    5. Moderate aortic valve stenosis        Plan:   Dalia was seen today for annual exam.    Diagnoses and all orders for this visit:    Annual physical exam:  Age-appropriate health screening reviewed, indicated tests ordered.     Lipoma of left upper extremity:  New problem, not severe, not bothersome. No intervention planned.    Hyperlipidemia, unspecified hyperlipidemia type:  Prior dx, pt has stopped Crestor, no history of cardiovascular disease, I do not recommend restarting at this time.    Essential hypertension:  Prior diagnosis, stable, well controlled on current management. No changes at this time, will continue to monitor.     Moderate aortic valve stenosis:  Prior dx, stable, the patient is asymptomatic. No intervention planned at this time.    Follow-up in about 1 year (around 1/31/2020) for EPP annual exam.  Josh Cho MD  Internal Medicine    Portions of this note were completed using medical dictation software. Please excuse typographical or syntax errors that were missed on review.    "

## 2019-02-01 DIAGNOSIS — R00.1 BRADYCARDIA: Primary | ICD-10-CM

## 2019-02-01 DIAGNOSIS — R00.1 BRADYCARDIA: ICD-10-CM

## 2019-02-01 DIAGNOSIS — Z95.0 CARDIAC PACEMAKER IN SITU: Primary | ICD-10-CM

## 2019-02-06 ENCOUNTER — PATIENT MESSAGE (OUTPATIENT)
Dept: ORTHOPEDICS | Facility: CLINIC | Age: 84
End: 2019-02-06

## 2019-02-06 ENCOUNTER — CLINICAL SUPPORT (OUTPATIENT)
Dept: CARDIOLOGY | Facility: HOSPITAL | Age: 84
End: 2019-02-06
Attending: INTERNAL MEDICINE
Payer: MEDICARE

## 2019-02-06 ENCOUNTER — OFFICE VISIT (OUTPATIENT)
Dept: ELECTROPHYSIOLOGY | Facility: CLINIC | Age: 84
End: 2019-02-06
Payer: MEDICARE

## 2019-02-06 ENCOUNTER — HOSPITAL ENCOUNTER (OUTPATIENT)
Dept: CARDIOLOGY | Facility: CLINIC | Age: 84
Discharge: HOME | End: 2019-02-06
Payer: MEDICARE

## 2019-02-06 ENCOUNTER — TELEPHONE (OUTPATIENT)
Dept: ELECTROPHYSIOLOGY | Facility: CLINIC | Age: 84
End: 2019-02-06

## 2019-02-06 VITALS
DIASTOLIC BLOOD PRESSURE: 66 MMHG | WEIGHT: 167.31 LBS | HEIGHT: 61 IN | HEART RATE: 61 BPM | BODY MASS INDEX: 31.59 KG/M2 | SYSTOLIC BLOOD PRESSURE: 132 MMHG

## 2019-02-06 DIAGNOSIS — R00.1 BRADYCARDIA: ICD-10-CM

## 2019-02-06 DIAGNOSIS — I10 ESSENTIAL HYPERTENSION: ICD-10-CM

## 2019-02-06 DIAGNOSIS — Z95.0 CARDIAC PACEMAKER IN SITU: ICD-10-CM

## 2019-02-06 DIAGNOSIS — M79.604 LEG PAIN, SUPERIOR, RIGHT: ICD-10-CM

## 2019-02-06 DIAGNOSIS — E78.5 HYPERLIPIDEMIA, UNSPECIFIED HYPERLIPIDEMIA TYPE: ICD-10-CM

## 2019-02-06 DIAGNOSIS — Z95.0 PACEMAKER: ICD-10-CM

## 2019-02-06 DIAGNOSIS — I49.5 SSS (SICK SINUS SYNDROME): ICD-10-CM

## 2019-02-06 DIAGNOSIS — Z95.0 CARDIAC PACEMAKER IN SITU: Primary | ICD-10-CM

## 2019-02-06 DIAGNOSIS — I48.91 ATRIAL FIBRILLATION, UNSPECIFIED TYPE: Primary | ICD-10-CM

## 2019-02-06 DIAGNOSIS — I48.0 PAROXYSMAL ATRIAL FIBRILLATION: ICD-10-CM

## 2019-02-06 DIAGNOSIS — I35.0 MODERATE AORTIC VALVE STENOSIS: ICD-10-CM

## 2019-02-06 PROCEDURE — 93010 ELECTROCARDIOGRAM REPORT: CPT | Mod: S$PBB,,, | Performed by: INTERNAL MEDICINE

## 2019-02-06 PROCEDURE — 93280 PM DEVICE PROGR EVAL DUAL: CPT

## 2019-02-06 PROCEDURE — 99214 OFFICE O/P EST MOD 30 MIN: CPT | Mod: PBBFAC,25 | Performed by: INTERNAL MEDICINE

## 2019-02-06 PROCEDURE — 99999 PR PBB SHADOW E&M-EST. PATIENT-LVL IV: ICD-10-PCS | Mod: PBBFAC,,, | Performed by: INTERNAL MEDICINE

## 2019-02-06 PROCEDURE — 99999 PR PBB SHADOW E&M-EST. PATIENT-LVL IV: CPT | Mod: PBBFAC,,, | Performed by: INTERNAL MEDICINE

## 2019-02-06 PROCEDURE — 93005 ELECTROCARDIOGRAM TRACING: CPT | Mod: PBBFAC,59 | Performed by: INTERNAL MEDICINE

## 2019-02-06 PROCEDURE — 99214 PR OFFICE/OUTPT VISIT, EST, LEVL IV, 30-39 MIN: ICD-10-PCS | Mod: S$PBB,,, | Performed by: INTERNAL MEDICINE

## 2019-02-06 PROCEDURE — 93010 RHYTHM STRIP: ICD-10-PCS | Mod: S$PBB,,, | Performed by: INTERNAL MEDICINE

## 2019-02-06 PROCEDURE — 99214 OFFICE O/P EST MOD 30 MIN: CPT | Mod: S$PBB,,, | Performed by: INTERNAL MEDICINE

## 2019-02-06 NOTE — TELEPHONE ENCOUNTER
Pt relocated from [facility] after the [place]. Currently lives with son in [facility] and sometimes with her daughter in [place].   Unsure if her daughter has her home monitor. Pt wishes to transfer care to [facility]  Her son, Norbert will contact our clinic and let us know if she needs a new monitor. We will have to have her account transferred in [employer].  Will await return phone call.
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
## 2019-02-07 ENCOUNTER — PATIENT MESSAGE (OUTPATIENT)
Dept: ELECTROPHYSIOLOGY | Facility: CLINIC | Age: 84
End: 2019-02-07

## 2019-02-08 DIAGNOSIS — I48.91 ATRIAL FIBRILLATION, UNSPECIFIED TYPE: ICD-10-CM

## 2019-02-11 ENCOUNTER — CLINICAL SUPPORT (OUTPATIENT)
Dept: CARDIOLOGY | Facility: CLINIC | Age: 84
End: 2019-02-11
Attending: NURSE PRACTITIONER
Payer: MEDICARE

## 2019-02-11 DIAGNOSIS — M79.604 LEG PAIN, SUPERIOR, RIGHT: ICD-10-CM

## 2019-02-11 LAB
OHS CV RIGHT ABI LOWER EXTREMITY (NO CALC): 1.58
RIGHT ANT TIBIAL SYS PSV: 72 CM/S
RIGHT CFA PSV: 163 CM/S
RIGHT EXTERNAL ILLIAC PSV: 139 CM/S
RIGHT POPLITEAL PSV: 103 CM/S
RIGHT POST TIBIAL SYS PSV: 68 CM/S
RIGHT PROFUNDA SYS PSV: 136 CM/S
RIGHT SUPER FEMORAL DIST SYS PSV: 103 CM/S
RIGHT SUPER FEMORAL MID SYS PSV: 124 CM/S
RIGHT SUPER FEMORAL OSTIAL SYS PSV: 155 CM/S
RIGHT SUPER FEMORAL PROX SYS PSV: 137 CM/S
RIGHT TIB/PER TRUNK SYS PSV: 81 CM/S

## 2019-02-11 PROCEDURE — 93926 LOWER EXTREMITY STUDY: CPT | Mod: PBBFAC,RT | Performed by: INTERNAL MEDICINE

## 2019-02-11 PROCEDURE — 93926 CV US DOPPLER ARTERIAL LEG RIGHT (CUPID ONLY): ICD-10-PCS | Mod: 26,S$PBB,RT, | Performed by: INTERNAL MEDICINE

## 2019-02-27 ENCOUNTER — PATIENT MESSAGE (OUTPATIENT)
Dept: INTERNAL MEDICINE | Facility: CLINIC | Age: 84
End: 2019-02-27

## 2019-03-11 ENCOUNTER — PATIENT MESSAGE (OUTPATIENT)
Dept: INTERNAL MEDICINE | Facility: CLINIC | Age: 84
End: 2019-03-11

## 2019-03-11 DIAGNOSIS — G89.29 OTHER CHRONIC PAIN: Primary | ICD-10-CM

## 2019-03-11 DIAGNOSIS — I10 ESSENTIAL HYPERTENSION: ICD-10-CM

## 2019-03-11 DIAGNOSIS — I48.91 ATRIAL FIBRILLATION, UNSPECIFIED TYPE: ICD-10-CM

## 2019-03-12 RX ORDER — DULOXETIN HYDROCHLORIDE 60 MG/1
60 CAPSULE, DELAYED RELEASE ORAL DAILY
Qty: 90 CAPSULE | Refills: 3 | Status: SHIPPED | OUTPATIENT
Start: 2019-03-12

## 2019-03-12 RX ORDER — CARVEDILOL 6.25 MG/1
6.25 TABLET ORAL 2 TIMES DAILY WITH MEALS
Qty: 180 TABLET | Refills: 3 | Status: SHIPPED | OUTPATIENT
Start: 2019-03-12

## 2019-03-12 RX ORDER — PREGABALIN 75 MG/1
75 CAPSULE ORAL 2 TIMES DAILY
Qty: 180 CAPSULE | Refills: 3 | Status: SHIPPED | OUTPATIENT
Start: 2019-03-12 | End: 2019-07-03 | Stop reason: SDUPTHER

## 2019-03-12 RX ORDER — FELODIPINE 5 MG/1
5 TABLET, EXTENDED RELEASE ORAL DAILY
Qty: 90 TABLET | Refills: 3 | Status: SHIPPED | OUTPATIENT
Start: 2019-03-12

## 2019-03-20 RX ORDER — DICLOFENAC SODIUM 10 MG/G
2 GEL TOPICAL DAILY
Qty: 100 G | Refills: 11 | Status: SHIPPED | OUTPATIENT
Start: 2019-03-20

## 2019-05-28 ENCOUNTER — TELEPHONE (OUTPATIENT)
Dept: ELECTROPHYSIOLOGY | Facility: CLINIC | Age: 84
End: 2019-05-28

## 2019-05-28 NOTE — TELEPHONE ENCOUNTER
Enrolled pt in [employer]. Monitor is not currently connected. Spoke with her son Norbert and he will ensure monitor is plugged in. Next remote schedule June 3rd. He states his mother will be in town that week.    ----- Message from Belinda Adame MA sent at 5/28/2019 11:15 AM CDT -----  Contact: Son      ----- Message -----  From: Elaine Jackson  Sent: 5/28/2019  10:51 AM  To: Valeria Aguirre Staff    .Patient Returning Call from [facility]    Who Left Message for Patient: Liz  Communication Preference: [phone]  Additional Information: The answer is yes she does have one-[employer] monitor.

## 2019-07-03 ENCOUNTER — PATIENT MESSAGE (OUTPATIENT)
Dept: INTERNAL MEDICINE | Facility: CLINIC | Age: 84
End: 2019-07-03

## 2019-07-03 DIAGNOSIS — G89.29 OTHER CHRONIC PAIN: ICD-10-CM

## 2019-07-03 RX ORDER — PREGABALIN 75 MG/1
75 CAPSULE ORAL 2 TIMES DAILY
Qty: 180 CAPSULE | Refills: 3 | Status: SHIPPED | OUTPATIENT
Start: 2019-07-03

## 2019-08-16 ENCOUNTER — TELEPHONE (OUTPATIENT)
Dept: ELECTROPHYSIOLOGY | Facility: CLINIC | Age: 84
End: 2019-08-16

## 2019-08-16 NOTE — TELEPHONE ENCOUNTER
Left message with Radames with new appointment dates and times. Appointment slip mailed    ----- Message from Fauzia Gaemz sent at 8/16/2019  3:15 PM CDT -----  Contact: Pt son  Radames   Pt would like to rescheduled appt that's scheduled on 9/5/19 because the pt will be out of town September and October and she has a device. Please schedule pt in the month of November and call her son Radames @ [phone]. Thank you.

## 2019-09-24 ENCOUNTER — PATIENT MESSAGE (OUTPATIENT)
Dept: INTERNAL MEDICINE | Facility: CLINIC | Age: 84
End: 2019-09-24

## 2020-10-01 ENCOUNTER — PATIENT MESSAGE (OUTPATIENT)
Dept: OTHER | Facility: OTHER | Age: 85
End: 2020-10-01

## 2020-10-05 ENCOUNTER — PATIENT MESSAGE (OUTPATIENT)
Dept: ADMINISTRATIVE | Facility: HOSPITAL | Age: 85
End: 2020-10-05

## 2020-12-11 ENCOUNTER — PATIENT MESSAGE (OUTPATIENT)
Dept: OTHER | Facility: OTHER | Age: 85
End: 2020-12-11

## 2021-01-04 ENCOUNTER — PATIENT MESSAGE (OUTPATIENT)
Dept: ADMINISTRATIVE | Facility: HOSPITAL | Age: 86
End: 2021-01-04

## 2021-02-08 ENCOUNTER — TELEPHONE (OUTPATIENT)
Dept: CARDIOLOGY | Facility: HOSPITAL | Age: 86
End: 2021-02-08

## 2021-04-05 ENCOUNTER — PATIENT MESSAGE (OUTPATIENT)
Dept: ADMINISTRATIVE | Facility: HOSPITAL | Age: 86
End: 2021-04-05

## 2021-04-28 ENCOUNTER — PATIENT MESSAGE (OUTPATIENT)
Dept: RESEARCH | Facility: HOSPITAL | Age: 86
End: 2021-04-28

## 2021-07-29 ENCOUNTER — TELEPHONE (OUTPATIENT)
Dept: ELECTROPHYSIOLOGY | Facility: CLINIC | Age: 86
End: 2021-07-29

## 2022-04-08 ENCOUNTER — TELEPHONE (OUTPATIENT)
Dept: CARDIOLOGY | Facility: HOSPITAL | Age: 87
End: 2022-04-08
Payer: MEDICARE

## 2022-04-08 NOTE — TELEPHONE ENCOUNTER
Called patient to have her send a remote transmission from her [facility] home monitor. No answer, I left a voice message for her to send a remote transmission or return my call. I left my direct number and the number to [employer].